# Patient Record
Sex: MALE | Race: WHITE | Employment: FULL TIME | URBAN - METROPOLITAN AREA
[De-identification: names, ages, dates, MRNs, and addresses within clinical notes are randomized per-mention and may not be internally consistent; named-entity substitution may affect disease eponyms.]

---

## 2022-07-14 ENCOUNTER — TELEPHONE (OUTPATIENT)
Dept: FAMILY MEDICINE CLINIC | Age: 37
End: 2022-07-14

## 2022-07-14 NOTE — TELEPHONE ENCOUNTER
----- Message from Valente Swift sent at 7/14/2022 12:05 PM EDT -----  Subject: Message to Provider    QUESTIONS  Information for Provider? Pt is checking status of being set up as new pt   with provider in office. message sent 7/8 and has not heard back yet. 7/14.  ---------------------------------------------------------------------------  --------------  Frederick QUINONES  [phone]; OK to leave message on voicemail  ---------------------------------------------------------------------------  --------------  SCRIPT ANSWERS  Relationship to Patient?  Self